# Patient Record
Sex: MALE | Race: WHITE
[De-identification: names, ages, dates, MRNs, and addresses within clinical notes are randomized per-mention and may not be internally consistent; named-entity substitution may affect disease eponyms.]

---

## 2019-01-29 ENCOUNTER — HOSPITAL ENCOUNTER (EMERGENCY)
Dept: HOSPITAL 11 - JP.ED | Age: 29
Discharge: HOME | End: 2019-01-29
Payer: MEDICAID

## 2019-01-29 DIAGNOSIS — J11.1: Primary | ICD-10-CM

## 2019-01-29 PROCEDURE — 81001 URINALYSIS AUTO W/SCOPE: CPT

## 2019-01-29 PROCEDURE — 96374 THER/PROPH/DIAG INJ IV PUSH: CPT

## 2019-01-29 PROCEDURE — 36415 COLL VENOUS BLD VENIPUNCTURE: CPT

## 2019-01-29 PROCEDURE — 96361 HYDRATE IV INFUSION ADD-ON: CPT

## 2019-01-29 PROCEDURE — 80053 COMPREHEN METABOLIC PANEL: CPT

## 2019-01-29 PROCEDURE — 96375 TX/PRO/DX INJ NEW DRUG ADDON: CPT

## 2019-01-29 PROCEDURE — 99284 EMERGENCY DEPT VISIT MOD MDM: CPT

## 2019-01-29 PROCEDURE — 85025 COMPLETE CBC W/AUTO DIFF WBC: CPT

## 2019-01-29 PROCEDURE — 87804 INFLUENZA ASSAY W/OPTIC: CPT

## 2019-01-29 NOTE — EDM.PDOC
ED HPI GENERAL MEDICAL PROBLEM





- General


Chief Complaint: Gastrointestinal Problem


Stated Complaint: VOMITING


Time Seen by Provider: 01/29/19 20:13


Source of Information: Reports: Patient


History Limitations: Reports: No Limitations





- History of Present Illness


INITIAL COMMENTS - FREE TEXT/NARRATIVE: 





This gentleman comes in with flulike symptoms since this morning. He said he's 

vomited a number of times. He has a low-grade fever but severe body aches and 

abdominal cramps. He did not get a flu shot. He says he doesn't believe in 

them. He hasn't had a bowel movement for about 2 days.


Treatments PTA: Reports: Other (see below)


Other Treatments PTA: none





- Related Data


 Allergies











Allergy/AdvReac Type Severity Reaction Status Date / Time


 


No Known Allergies Allergy   Verified 01/29/19 19:34











Home Meds: 


 Home Meds





NK [No Known Home Meds]  01/29/19 [History]











Social & Family History





- Tobacco Use


Smoking Status *Q: Never Smoker


Second Hand Smoke Exposure: No





- Caffeine Use


Caffeine Use: Reports: Coffee, Energy Drinks


Other Caffeine Use: moderation





- Alcohol Use


Days Per Week of Alcohol Use: 2


Number of Drinks Per Day: 4


Total Drinks Per Week: 8





- Recreational Drug Use


Recreational Drug Use: No





ED ROS GENERAL





- Review of Systems


Review Of Systems: See Below


Constitutional: Reports: Fever, Chills


HEENT: Reports: No Symptoms


Respiratory: Reports: No Symptoms


Cardiovascular: Reports: No Symptoms


Endocrine: Reports: No Symptoms


GI/Abdominal: Reports: Abdominal Pain, Vomiting.  Denies: Diarrhea


: Reports: No Symptoms


Musculoskeletal: Reports: No Symptoms


Skin: Reports: No Symptoms


Neurological: Reports: No Symptoms


Psychiatric: Reports: No Symptoms


Hematologic/Lymphatic: Reports: No Symptoms


Immunologic: Reports: No Symptoms





ED EXAM, GI/ABD





- Physical Exam


Exam: See Below


Exam Limited By: No Limitations


General Appearance: Alert, WD/WN, Mild Distress


Eyes: Bilateral: Normal Appearance


Ears: Normal External Exam


Nose: Normal Inspection


Throat/Mouth: Normal Inspection


Head: Atraumatic


Neck: Normal Inspection


Respiratory/Chest: No Respiratory Distress, Lungs Clear


Cardiovascular: Normal Peripheral Pulses, Regular Rate, Rhythm, No Murmur


GI/Abdominal Exam: Normal Bowel Sounds, Soft, Non-Tender


Back Exam: Normal Inspection


Extremities: Normal Inspection


Neurological: Alert, Oriented


Psychiatric: Normal Affect


Skin Exam: Warm, Dry





Course





- Vital Signs


Last Recorded V/S: 





 Last Vital Signs











Temp  38.0 C   01/29/19 19:35


 


Pulse  109 H  01/29/19 19:35


 


Resp  16   01/29/19 19:35


 


BP  132/86   01/29/19 19:35


 


Pulse Ox  98   01/29/19 19:35














- Orders/Labs/Meds


Orders: 





 Active Orders 24 hr











 Category Date Time Status


 


 Sodium Chloride 0.9% [Normal Saline] 1,000 ml Med  01/29/19 21:27 Active





 IV .BOLUS   


 


 Sodium Chloride 0.9% [Saline Flush] Med  01/29/19 20:20 Active





 10 ml FLUSH ASDIRECTED PRN   


 


 Saline Lock Insert [OM.PC] Urgent Oth  01/29/19 20:19 Ordered








 Medication Orders





Sodium Chloride (Normal Saline)  1,000 mls @ 999 mls/hr IV .BOLUS ONE


   Stop: 01/29/19 22:27


   Last Admin: 01/29/19 21:43  Dose: 999 mls/hr


Sodium Chloride (Saline Flush)  10 ml FLUSH ASDIRECTED PRN


   PRN Reason: Keep Vein Open


   Last Admin: 01/29/19 20:59  Dose: 10 ml








Labs: 





 Laboratory Tests











  01/29/19 01/29/19 01/29/19 Range/Units





  20:44 20:44 21:14 


 


WBC  8.7    (4.5-11.0)  K/uL


 


RBC  5.35    (4.30-5.90)  M/uL


 


Hgb  15.9 H    (12.0-15.0)  g/dL


 


Hct  46.2    (40.0-54.0)  %


 


MCV  86    (80-98)  fL


 


MCH  30    (27-31)  pg


 


MCHC  34    (32-36)  %


 


Plt Count  199    (150-400)  K/uL


 


Neut % (Auto)  88 H    (36-66)  %


 


Lymph % (Auto)  4 L    (24-44)  %


 


Mono % (Auto)  8 H    (2-6)  %


 


Eos % (Auto)  0 L    (2-4)  %


 


Baso % (Auto)  0    (0-1)  %


 


Sodium   138 L   (140-148)  mmol/L


 


Potassium   3.5 L   (3.6-5.2)  mmol/L


 


Chloride   100   (100-108)  mmol/L


 


Carbon Dioxide   27   (21-32)  mmol/L


 


Anion Gap   14.5 H   (5.0-14.0)  mmol/L


 


BUN   25 H   (7-18)  mg/dL


 


Creatinine   1.2   (0.8-1.3)  mg/dL


 


Est Cr Clr Drug Dosing   86.40   mL/min


 


Estimated GFR (MDRD)   > 60   (>60)  


 


Glucose   121 H   ()  mg/dL


 


Calcium   8.7   (8.5-10.1)  mg/dL


 


Total Bilirubin   0.5   (0.2-1.0)  mg/dL


 


AST   23   (15-37)  U/L


 


ALT   31   (12-78)  U/L


 


Alkaline Phosphatase   57   ()  U/L


 


Total Protein   7.1   (6.4-8.2)  g/dL


 


Albumin   3.7   (3.4-5.0)  g/dL


 


Globulin   3.4   (2.3-3.5)  g/dL


 


Albumin/Globulin Ratio   1.1 L   (1.2-2.2)  


 


Urine Color    Yellow  


 


Urine Appearance    Clear  


 


Urine pH    5.0  (4.5-8.0)  


 


Ur Specific Gravity    1.020  (1.008-1.030)  


 


Urine Protein    Negative  (NEGATIVE)  mg/dL


 


Urine Glucose (UA)    Normal  (NEGATIVE)  mg/dL


 


Urine Ketones    50 H  (NEGATIVE)  mg/dL


 


Urine Occult Blood    Negative  (NEGATIVE)  


 


Urine Nitrite    Negative  (NEGAITVE)  


 


Urine Bilirubin    Negative  (NEGATIVE)  


 


Urine Urobilinogen    Normal  (NORMAL)  mg/dL


 


Ur Leukocyte Esterase    Negative  (NEGATIVE)  


 


Urine RBC    Not seen  (0-5)  


 


Urine WBC    Not seen  (0-5)  


 


Ur Epithelial Cells    Rare  


 


Amorphous Sediment    Not seen  


 


Urine Bacteria    Not seen  


 


Urine Mucus    Moderate  











Meds: 





Medications











Generic Name Dose Route Start Last Admin





  Trade Name Freq  PRN Reason Stop Dose Admin


 


Sodium Chloride  1,000 mls @ 999 mls/hr  01/29/19 21:27  01/29/19 21:43





  Normal Saline  IV  01/29/19 22:27  999 mls/hr





  .BOLUS ONE   Administration





     





     





     





     


 


Sodium Chloride  10 ml  01/29/19 20:20  01/29/19 20:59





  Saline Flush  FLUSH   10 ml





  ASDIRECTED PRN   Administration





  Keep Vein Open   





     





     





     














Discontinued Medications














Generic Name Dose Route Start Last Admin





  Trade Name Freq  PRN Reason Stop Dose Admin


 


Sodium Chloride  1,000 mls @ 999 mls/hr  01/29/19 20:20  01/29/19 20:42





  Normal Saline  IV  01/29/19 21:20  999 mls/hr





  .BOLUS ONE   Administration





     





     





     





     


 


Ketorolac Tromethamine  30 mg  01/29/19 20:20  01/29/19 20:54





  Toradol  IVPUSH  01/29/19 20:21  30 mg





  ONETIME ONE   Administration





     





     





     





     


 


Ondansetron HCl  4 mg  01/29/19 20:20  01/29/19 20:51





  Zofran  IVPUSH  01/29/19 20:21  4 mg





  ONETIME ONE   Administration





     





     





     





     














- Re-Assessments/Exams


Free Text/Narrative Re-Assessment/Exam: 





01/29/19 22:10


This patient received 2 L IV normal saline. Shortly after the first liter he 

said he felt 100% better. He also received Zofran 4 mg IV.





Departure





- Departure


Time of Disposition: 22:10


Disposition: Home, Self-Care 01


Condition: Fair


Clinical Impression: 


 Influenza-like illness








- Discharge Information


Referrals: 


PCP,None [Primary Care Provider] - 


Additional Instructions: 


For vomiting use the ondansetron 4 mg (#10) 1 sublingual every 8 hours. If one 

tablet doesn't work you could switch to 2 tablets.





Clear liquid diet for the next 24 hours.


This is a viral illness and like a viral stomach flu but the influenza test was 

negative. It's contagious just like the flu or any other viral infection. You'

ll be contagious until after you are well and without fever or other symptoms 

for at least 24 hours





- My Orders


Last 24 Hours: 





My Active Orders





01/29/19 20:19


Saline Lock Insert [OM.PC] Urgent 





01/29/19 20:20


Sodium Chloride 0.9% [Saline Flush]   10 ml FLUSH ASDIRECTED PRN 





01/29/19 21:27


Sodium Chloride 0.9% [Normal Saline] 1,000 ml IV .BOLUS 














- Assessment/Plan


Last 24 Hours: 





My Active Orders





01/29/19 20:19


Saline Lock Insert [OM.PC] Urgent 





01/29/19 20:20


Sodium Chloride 0.9% [Saline Flush]   10 ml FLUSH ASDIRECTED PRN 





01/29/19 21:27


Sodium Chloride 0.9% [Normal Saline] 1,000 ml IV .BOLUS